# Patient Record
Sex: MALE | Race: OTHER | NOT HISPANIC OR LATINO | URBAN - METROPOLITAN AREA
[De-identification: names, ages, dates, MRNs, and addresses within clinical notes are randomized per-mention and may not be internally consistent; named-entity substitution may affect disease eponyms.]

---

## 2017-10-20 ENCOUNTER — EMERGENCY (EMERGENCY)
Facility: HOSPITAL | Age: 37
LOS: 1 days | Discharge: HOME | End: 2017-10-20
Attending: EMERGENCY MEDICINE | Admitting: EMERGENCY MEDICINE
Payer: MEDICAID

## 2017-10-20 VITALS
SYSTOLIC BLOOD PRESSURE: 136 MMHG | HEART RATE: 65 BPM | DIASTOLIC BLOOD PRESSURE: 89 MMHG | OXYGEN SATURATION: 98 % | RESPIRATION RATE: 18 BRPM | TEMPERATURE: 98 F

## 2017-10-20 PROCEDURE — 99284 EMERGENCY DEPT VISIT MOD MDM: CPT

## 2017-10-20 RX ORDER — IBUPROFEN 200 MG
1 TABLET ORAL
Qty: 20 | Refills: 0 | OUTPATIENT
Start: 2017-10-20 | End: 2017-10-25

## 2017-10-20 RX ORDER — IBUPROFEN 200 MG
600 TABLET ORAL ONCE
Qty: 0 | Refills: 0 | Status: COMPLETED | OUTPATIENT
Start: 2017-10-20 | End: 2017-10-20

## 2017-10-20 RX ADMIN — Medication 600 MILLIGRAM(S): at 16:44

## 2017-10-20 NOTE — ED PROVIDER NOTE - MEDICAL DECISION MAKING DETAILS
37y/o M with dental pain. Pt is driving, has not attempted any OTC analgesics. Will give Motrin 600mg, send prescription to pharmacy, f/u with dental.

## 2017-10-20 NOTE — ED PROVIDER NOTE - OBJECTIVE STATEMENT
37y/o M with PMHx of HTN, HLD, presents to the ED c/o L upper tooth pain x2mo, worsening x3d. His pain radiates to his L jaw, is 8/10 when food is stuck in there. His pain is worse when eating. He visited dentist 2mo ago, was given painkillers and told he needed surgery. He visited [facility] and did not get surgery. His pain worsened after returning from his trip. He has not taken any medication PTA. Denies fevers/chills, nausea/vomiting, chest pain, HA, any other complaints.

## 2017-10-20 NOTE — ED PROVIDER NOTE - PROGRESS NOTE DETAILS
CELY Steinberg: spoke with dental - pt not having any signs/symptoms indicating acute infection. Recommend following up in outpatient clinic for evaluation and possible oral surgery referral.

## 2017-10-20 NOTE — ED PROVIDER NOTE - NS_ ATTENDINGSCRIBEDETAILS _ED_A_ED_FT
The scribe's documentation has been prepared under my direction and personally reviewed by me, Camryn Yoo MD, in its entirety. I confirm that the note above accurately reflects all work, treatment, procedures, and medical decision making performed by me.

## 2017-10-20 NOTE — ED PROVIDER NOTE - CARE PLAN
Principal Discharge DX:	Toothache Principal Discharge DX:	Toothache  Instructions for follow-up, activity and diet:	Take Ibuprofen 600mg every 6 hours as needed for mild-moderate pain. Follow up with our dental clinic at [phone] within 1 week for further evaluation. Return to the Emergency Department for any new, worsening or concerning symptoms.

## 2017-10-20 NOTE — ED PROVIDER NOTE - PLAN OF CARE
Take Ibuprofen 600mg every 6 hours as needed for mild-moderate pain. Follow up with our dental clinic at [phone] within 1 week for further evaluation. Return to the Emergency Department for any new, worsening or concerning symptoms.

## 2021-03-26 DIAGNOSIS — Z01.818 ENCOUNTER FOR OTHER PREPROCEDURAL EXAMINATION: ICD-10-CM

## 2021-03-26 PROBLEM — E78.5 HYPERLIPIDEMIA, UNSPECIFIED: Chronic | Status: ACTIVE | Noted: 2017-10-20

## 2021-03-26 PROBLEM — I10 ESSENTIAL (PRIMARY) HYPERTENSION: Chronic | Status: ACTIVE | Noted: 2017-10-20

## 2021-03-26 PROBLEM — Z00.00 ENCOUNTER FOR PREVENTIVE HEALTH EXAMINATION: Status: ACTIVE | Noted: 2021-03-26

## 2021-03-27 ENCOUNTER — APPOINTMENT (OUTPATIENT)
Dept: DISASTER EMERGENCY | Facility: CLINIC | Age: 41
End: 2021-03-27

## 2021-03-28 LAB — SARS-COV-2 N GENE NPH QL NAA+PROBE: NOT DETECTED

## 2023-04-12 ENCOUNTER — OFFICE (OUTPATIENT)
Dept: URBAN - METROPOLITAN AREA CLINIC 12 | Facility: CLINIC | Age: 43
Setting detail: OPHTHALMOLOGY
End: 2023-04-12
Payer: MEDICAID

## 2023-04-12 DIAGNOSIS — H02.834: ICD-10-CM

## 2023-04-12 DIAGNOSIS — H02.831: ICD-10-CM

## 2023-04-12 DIAGNOSIS — H35.3131: ICD-10-CM

## 2023-04-12 DIAGNOSIS — H25.13: ICD-10-CM

## 2023-04-12 DIAGNOSIS — H52.13: ICD-10-CM

## 2023-04-12 PROCEDURE — 92015 DETERMINE REFRACTIVE STATE: CPT | Performed by: STUDENT IN AN ORGANIZED HEALTH CARE EDUCATION/TRAINING PROGRAM

## 2023-04-12 PROCEDURE — 92004 COMPRE OPH EXAM NEW PT 1/>: CPT | Performed by: STUDENT IN AN ORGANIZED HEALTH CARE EDUCATION/TRAINING PROGRAM

## 2023-04-12 PROCEDURE — 92134 CPTRZ OPH DX IMG PST SGM RTA: CPT | Performed by: STUDENT IN AN ORGANIZED HEALTH CARE EDUCATION/TRAINING PROGRAM

## 2023-04-12 ASSESSMENT — REFRACTION_AUTOREFRACTION
OD_SPHERE: -0.50
OS_AXIS: 115
OS_SPHERE: -1.25
OD_CYLINDER: -1.50
OS_CYLINDER: -0.75
OD_AXIS: 087

## 2023-04-12 ASSESSMENT — KERATOMETRY
OD_K1POWER_DIOPTERS: 43.50
OD_AXISANGLE_DEGREES: 169
OD_K2POWER_DIOPTERS: 44.25
OS_K1POWER_DIOPTERS: 43.50
METHOD_AUTO_MANUAL: AUTO
OS_K2POWER_DIOPTERS: 44.25
OS_AXISANGLE_DEGREES: 025

## 2023-04-12 ASSESSMENT — REFRACTION_MANIFEST
OS_AXIS: 115
OD_CYLINDER: -1.50
OD_SPHERE: -0.50
OD_AXIS: 085
OS_CYLINDER: -0.75
OS_SPHERE: -1.25
OD_VA1: 20/20
OS_VA1: 20/20

## 2023-04-12 ASSESSMENT — AXIALLENGTH_DERIVED
OD_AL: 23.9464
OS_AL: 24.0979
OD_AL: 23.9464
OS_AL: 24.0979

## 2023-04-12 ASSESSMENT — REFRACTION_CURRENTRX
OS_CYLINDER: SPHERE
OS_VPRISM_DIRECTION: BF
OD_SPHERE: -1.50
OS_AXIS: 000
OS_SPHERE: +2.25
OD_VPRISM_DIRECTION: BF
OD_AXIS: 066
OS_OVR_VA: 20/
OD_ADD: +2.00
OD_CYLINDER: -0.25
OS_ADD: +2.00
OD_OVR_VA: 20/

## 2023-04-12 ASSESSMENT — SPHEQUIV_DERIVED
OD_SPHEQUIV: -1.25
OS_SPHEQUIV: -1.625
OS_SPHEQUIV: -1.625
OD_SPHEQUIV: -1.25

## 2023-04-12 ASSESSMENT — CONFRONTATIONAL VISUAL FIELD TEST (CVF)
OS_FINDINGS: FULL
OD_FINDINGS: FULL

## 2023-04-12 ASSESSMENT — LID EXAM ASSESSMENTS: OD_BROW_PTOSIS: +2

## 2023-04-12 ASSESSMENT — VISUAL ACUITY
OS_BCVA: 20/40-2
OD_BCVA: 20/30-2

## 2023-04-12 ASSESSMENT — TONOMETRY
OD_IOP_MMHG: 20
OS_IOP_MMHG: 17

## 2023-05-06 ENCOUNTER — OFFICE (OUTPATIENT)
Dept: URBAN - METROPOLITAN AREA CLINIC 88 | Facility: CLINIC | Age: 43
Setting detail: OPHTHALMOLOGY
End: 2023-05-06
Payer: MEDICAID

## 2023-05-06 DIAGNOSIS — H35.362: ICD-10-CM

## 2023-05-06 DIAGNOSIS — H00.11: ICD-10-CM

## 2023-05-06 DIAGNOSIS — D31.32: ICD-10-CM

## 2023-05-06 DIAGNOSIS — H25.13: ICD-10-CM

## 2023-05-06 DIAGNOSIS — H35.033: ICD-10-CM

## 2023-05-06 DIAGNOSIS — H11.151: ICD-10-CM

## 2023-05-06 PROBLEM — H02.834 DERMATOCHALASIS; RIGHT UPPER LID, LEFT UPPER LID: Status: ACTIVE | Noted: 2023-04-12

## 2023-05-06 PROBLEM — H02.831 DERMATOCHALASIS; RIGHT UPPER LID, LEFT UPPER LID: Status: ACTIVE | Noted: 2023-04-12

## 2023-05-06 PROBLEM — H52.7 REFRACTIVE ERROR ; BOTH EYES: Status: ACTIVE | Noted: 2023-04-12

## 2023-05-06 PROCEDURE — 92134 CPTRZ OPH DX IMG PST SGM RTA: CPT | Performed by: SPECIALIST

## 2023-05-06 PROCEDURE — 92012 INTRM OPH EXAM EST PATIENT: CPT | Performed by: SPECIALIST

## 2023-05-06 ASSESSMENT — KERATOMETRY
OS_K2POWER_DIOPTERS: 44.25
OS_K1POWER_DIOPTERS: 43.50
METHOD_AUTO_MANUAL: AUTO
OD_K2POWER_DIOPTERS: 44.25
OD_AXISANGLE_DEGREES: 169
OD_K1POWER_DIOPTERS: 43.50
OS_AXISANGLE_DEGREES: 025

## 2023-05-06 ASSESSMENT — AXIALLENGTH_DERIVED
OD_AL: 23.9464
OS_AL: 24.0979

## 2023-05-06 ASSESSMENT — SPHEQUIV_DERIVED
OS_SPHEQUIV: -1.625
OD_SPHEQUIV: -1.25

## 2023-05-06 ASSESSMENT — LID EXAM ASSESSMENTS: OD_BROW_PTOSIS: +2

## 2023-05-06 ASSESSMENT — REFRACTION_AUTOREFRACTION
OS_CYLINDER: -0.75
OS_SPHERE: -1.25
OS_AXIS: 115
OD_CYLINDER: -1.50
OD_SPHERE: -0.50
OD_AXIS: 087

## 2023-05-06 ASSESSMENT — CONFRONTATIONAL VISUAL FIELD TEST (CVF)
OD_FINDINGS: FULL
OS_FINDINGS: FULL

## 2023-05-06 ASSESSMENT — VISUAL ACUITY
OS_BCVA: 20/40-1
OD_BCVA: 20/20-1

## 2023-05-06 ASSESSMENT — TONOMETRY
OS_IOP_MMHG: 18
OD_IOP_MMHG: 19

## 2024-09-22 ENCOUNTER — OFFICE (OUTPATIENT)
Dept: URBAN - METROPOLITAN AREA CLINIC 12 | Facility: CLINIC | Age: 44
Setting detail: OPHTHALMOLOGY
End: 2024-09-22
Payer: MEDICAID

## 2024-09-22 DIAGNOSIS — H02.821: ICD-10-CM

## 2024-09-22 DIAGNOSIS — H25.13: ICD-10-CM

## 2024-09-22 DIAGNOSIS — D31.32: ICD-10-CM

## 2024-09-22 DIAGNOSIS — H11.441: ICD-10-CM

## 2024-09-22 DIAGNOSIS — H16.223: ICD-10-CM

## 2024-09-22 DIAGNOSIS — H11.151: ICD-10-CM

## 2024-09-22 DIAGNOSIS — H35.362: ICD-10-CM

## 2024-09-22 DIAGNOSIS — H52.4: ICD-10-CM

## 2024-09-22 DIAGNOSIS — H35.033: ICD-10-CM

## 2024-09-22 PROCEDURE — 92250 FUNDUS PHOTOGRAPHY W/I&R: CPT | Performed by: STUDENT IN AN ORGANIZED HEALTH CARE EDUCATION/TRAINING PROGRAM

## 2024-09-22 PROCEDURE — 92014 COMPRE OPH EXAM EST PT 1/>: CPT | Performed by: STUDENT IN AN ORGANIZED HEALTH CARE EDUCATION/TRAINING PROGRAM

## 2024-09-22 PROCEDURE — 92015 DETERMINE REFRACTIVE STATE: CPT | Performed by: STUDENT IN AN ORGANIZED HEALTH CARE EDUCATION/TRAINING PROGRAM

## 2024-09-22 ASSESSMENT — CONFRONTATIONAL VISUAL FIELD TEST (CVF)
OD_FINDINGS: FULL
OS_FINDINGS: FULL

## 2024-09-22 ASSESSMENT — LID EXAM ASSESSMENTS
OD_MEIBOMITIS: RUL 2+
OD_BROW_PTOSIS: +2
OS_BROW_PTOSIS: +2
OS_MEIBOMITIS: LUL 2+